# Patient Record
Sex: MALE | Race: ASIAN | NOT HISPANIC OR LATINO | Employment: FULL TIME | URBAN - METROPOLITAN AREA
[De-identification: names, ages, dates, MRNs, and addresses within clinical notes are randomized per-mention and may not be internally consistent; named-entity substitution may affect disease eponyms.]

---

## 2017-01-09 ENCOUNTER — OFFICE VISIT (OUTPATIENT)
Dept: OPHTHALMOLOGY | Facility: CLINIC | Age: 62
End: 2017-01-09
Payer: COMMERCIAL

## 2017-01-09 DIAGNOSIS — Z98.890 POST-OPERATIVE STATE: Primary | ICD-10-CM

## 2017-01-09 DIAGNOSIS — T85.398A EXPOSURE OF ORBITAL IMPLANT, INITIAL ENCOUNTER: ICD-10-CM

## 2017-01-09 PROCEDURE — 99024 POSTOP FOLLOW-UP VISIT: CPT | Mod: S$GLB,,, | Performed by: OPHTHALMOLOGY

## 2017-01-09 PROCEDURE — 99999 PR PBB SHADOW E&M-EST. PATIENT-LVL II: CPT | Mod: PBBFAC,,, | Performed by: OPHTHALMOLOGY

## 2017-01-09 RX ORDER — TOBRAMYCIN AND DEXAMETHASONE 3; 1 MG/ML; MG/ML
1 SUSPENSION/ DROPS OPHTHALMIC
Qty: 5 ML | Refills: 0 | Status: SHIPPED | OUTPATIENT
Start: 2017-01-09 | End: 2017-01-19

## 2017-01-09 NOTE — PROGRESS NOTES
Assessment /Plan     For exam results, see Encounter Report.    Post-operative state    Exposure of orbital implant, initial encounter    1) Pow #1 s/p removal of medpor conical orbital implant with placement of dermis fat graft.    - Pt doing well, denies pain. Removed temporary tarsorrhaphy. Removed prolene sutures over site of fat graft.  Steri-strips placed today.    - Start tobradex luana and tobradex gtts.  Plan discussed with the patient's daughter over the telephone.      Patient doing well! Post-operative instructions reviewed. Sutures removed. All questions answered.  Return in 1 week prn sooner any worsening of vision/symptoms or any concerns.    I have reviewed and concur with the resident's history, physical, assessment, and plan.  I have personally interviewed and examined the patient.

## 2017-01-16 ENCOUNTER — OFFICE VISIT (OUTPATIENT)
Dept: OPHTHALMOLOGY | Facility: CLINIC | Age: 62
End: 2017-01-16
Payer: COMMERCIAL

## 2017-01-16 DIAGNOSIS — Z98.890 POST-OPERATIVE STATE: Primary | ICD-10-CM

## 2017-01-16 PROCEDURE — 99024 POSTOP FOLLOW-UP VISIT: CPT | Mod: S$GLB,,, | Performed by: OPHTHALMOLOGY

## 2017-01-16 PROCEDURE — 99999 PR PBB SHADOW E&M-EST. PATIENT-LVL I: CPT | Mod: PBBFAC,,, | Performed by: OPHTHALMOLOGY

## 2017-01-16 PROCEDURE — 99211 OFF/OP EST MAY X REQ PHY/QHP: CPT | Mod: PBBFAC | Performed by: OPHTHALMOLOGY

## 2017-01-16 PROCEDURE — 92285 EXTERNAL OCULAR PHOTOGRAPHY: CPT | Mod: 50,PBBFAC | Performed by: OPHTHALMOLOGY

## 2017-01-16 NOTE — PROGRESS NOTES
HPI     Post-op Evaluation    Additional comments: sx 12/30/16           Comments   TD drops qid       Last edited by Dea Griggs on 1/16/2017  8:19 AM. (History)            Assessment /Plan     For exam results, see Encounter Report.    Post-operative state  -     External Photography - OU - Both Eyes      Patient doing well! Post-operative instructions reviewed. All questions answered. Return in 4 weeks prn sooner any worsening of vision/symptoms or any concerns.

## 2017-02-16 ENCOUNTER — OFFICE VISIT (OUTPATIENT)
Dept: OPHTHALMOLOGY | Facility: CLINIC | Age: 62
End: 2017-02-16
Payer: COMMERCIAL

## 2017-02-16 DIAGNOSIS — Z98.890 POST-OPERATIVE STATE: Primary | ICD-10-CM

## 2017-02-16 PROCEDURE — 99024 POSTOP FOLLOW-UP VISIT: CPT | Mod: S$GLB,,, | Performed by: OPHTHALMOLOGY

## 2017-02-16 PROCEDURE — 99999 PR PBB SHADOW E&M-EST. PATIENT-LVL II: CPT | Mod: PBBFAC,,, | Performed by: OPHTHALMOLOGY

## 2017-02-16 NOTE — PROGRESS NOTES
HPI     Post-op Evaluation    Additional comments: 1 month f/u           Comments   S/p removal ocular implant 12/30/16  Pt. Is here with wife today. He is having no pain. No drops.  Needs drivers license form filled out.       Last edited by Dea Griggs on 2/16/2017  8:01 AM. (History)            Assessment /Plan     For exam results, see Encounter Report.    Post-operative state      S/p placement removal of ocular implant with placement of dermis fat graft os.  Patient healing well.  Conformer in place.      Follow-up with Hugo Rosas for prosthesis fitting.  Return in one month prn sooner any worsening.

## 2017-03-21 ENCOUNTER — OFFICE VISIT (OUTPATIENT)
Dept: OPHTHALMOLOGY | Facility: CLINIC | Age: 62
End: 2017-03-21
Payer: COMMERCIAL

## 2017-03-21 DIAGNOSIS — Z98.890 POST-OPERATIVE STATE: Primary | ICD-10-CM

## 2017-03-21 PROCEDURE — 99999 PR PBB SHADOW E&M-EST. PATIENT-LVL II: CPT | Mod: PBBFAC,,, | Performed by: OPHTHALMOLOGY

## 2017-03-21 PROCEDURE — 99024 POSTOP FOLLOW-UP VISIT: CPT | Mod: S$GLB,,, | Performed by: OPHTHALMOLOGY

## 2017-03-21 NOTE — PROGRESS NOTES
HPI     S/p removal ocular implant with placement of dermis fat graft 12/30/16  Pt. Is here with wife today. He is having no pain. No drops.         Last edited by Ninfa Reed MD on 3/21/2017  8:56 AM.         Assessment /Plan     For exam results, see Encounter Report.    Post-operative state      Patient doing great after removal of ocular implant and placement of dermis fat graft.  Fitted with prosthesis by Hugo Rosas.  Patient very happy.  Emphasized importance of prosthesis hygiene.  Return in 6 months prn sooner any worsening.

## 2019-08-20 ENCOUNTER — TELEPHONE (OUTPATIENT)
Dept: HEMATOLOGY/ONCOLOGY | Facility: CLINIC | Age: 64
End: 2019-08-20

## 2019-09-03 ENCOUNTER — LAB VISIT (OUTPATIENT)
Dept: LAB | Facility: HOSPITAL | Age: 64
End: 2019-09-03
Payer: MEDICAID

## 2019-09-03 ENCOUNTER — INITIAL CONSULT (OUTPATIENT)
Dept: HEMATOLOGY/ONCOLOGY | Facility: CLINIC | Age: 64
End: 2019-09-03
Payer: MEDICAID

## 2019-09-03 VITALS
RESPIRATION RATE: 17 BRPM | DIASTOLIC BLOOD PRESSURE: 72 MMHG | SYSTOLIC BLOOD PRESSURE: 113 MMHG | TEMPERATURE: 98 F | HEART RATE: 69 BPM | WEIGHT: 163.56 LBS | HEIGHT: 64 IN | OXYGEN SATURATION: 97 % | BODY MASS INDEX: 27.92 KG/M2

## 2019-09-03 DIAGNOSIS — D72.829 LEUKOCYTOSIS, UNSPECIFIED TYPE: Primary | ICD-10-CM

## 2019-09-03 DIAGNOSIS — D72.829 LEUKOCYTOSIS, UNSPECIFIED TYPE: ICD-10-CM

## 2019-09-03 LAB
BASOPHILS # BLD AUTO: 0.08 K/UL (ref 0–0.2)
BASOPHILS # BLD AUTO: 0.08 K/UL (ref 0–0.2)
BASOPHILS NFR BLD: 0.7 % (ref 0–1.9)
BASOPHILS NFR BLD: 0.7 % (ref 0–1.9)
DIFFERENTIAL METHOD: ABNORMAL
DIFFERENTIAL METHOD: ABNORMAL
EOSINOPHIL # BLD AUTO: 1.1 K/UL (ref 0–0.5)
EOSINOPHIL # BLD AUTO: 1.1 K/UL (ref 0–0.5)
EOSINOPHIL NFR BLD: 9.3 % (ref 0–8)
EOSINOPHIL NFR BLD: 9.3 % (ref 0–8)
ERYTHROCYTE [DISTWIDTH] IN BLOOD BY AUTOMATED COUNT: 13.1 % (ref 11.5–14.5)
ERYTHROCYTE [DISTWIDTH] IN BLOOD BY AUTOMATED COUNT: 13.1 % (ref 11.5–14.5)
HCT VFR BLD AUTO: 49.1 % (ref 40–54)
HCT VFR BLD AUTO: 49.1 % (ref 40–54)
HGB BLD-MCNC: 16.9 G/DL (ref 14–18)
HGB BLD-MCNC: 16.9 G/DL (ref 14–18)
IMM GRANULOCYTES # BLD AUTO: 0.04 K/UL (ref 0–0.04)
IMM GRANULOCYTES # BLD AUTO: 0.04 K/UL (ref 0–0.04)
IMM GRANULOCYTES NFR BLD AUTO: 0.3 % (ref 0–0.5)
IMM GRANULOCYTES NFR BLD AUTO: 0.3 % (ref 0–0.5)
LDH SERPL L TO P-CCNC: 223 U/L (ref 110–260)
LYMPHOCYTES # BLD AUTO: 2.6 K/UL (ref 1–4.8)
LYMPHOCYTES # BLD AUTO: 2.6 K/UL (ref 1–4.8)
LYMPHOCYTES NFR BLD: 22.1 % (ref 18–48)
LYMPHOCYTES NFR BLD: 22.1 % (ref 18–48)
MCH RBC QN AUTO: 30.5 PG (ref 27–31)
MCH RBC QN AUTO: 30.5 PG (ref 27–31)
MCHC RBC AUTO-ENTMCNC: 34.4 G/DL (ref 32–36)
MCHC RBC AUTO-ENTMCNC: 34.4 G/DL (ref 32–36)
MCV RBC AUTO: 89 FL (ref 82–98)
MCV RBC AUTO: 89 FL (ref 82–98)
MONOCYTES # BLD AUTO: 0.6 K/UL (ref 0.3–1)
MONOCYTES # BLD AUTO: 0.6 K/UL (ref 0.3–1)
MONOCYTES NFR BLD: 5.3 % (ref 4–15)
MONOCYTES NFR BLD: 5.3 % (ref 4–15)
NEUTROPHILS # BLD AUTO: 7.3 K/UL (ref 1.8–7.7)
NEUTROPHILS # BLD AUTO: 7.3 K/UL (ref 1.8–7.7)
NEUTROPHILS NFR BLD: 62.3 % (ref 38–73)
NEUTROPHILS NFR BLD: 62.3 % (ref 38–73)
NRBC BLD-RTO: 0 /100 WBC
NRBC BLD-RTO: 0 /100 WBC
PLATELET # BLD AUTO: 227 K/UL (ref 150–350)
PLATELET # BLD AUTO: 227 K/UL (ref 150–350)
PMV BLD AUTO: 10 FL (ref 9.2–12.9)
PMV BLD AUTO: 10 FL (ref 9.2–12.9)
RBC # BLD AUTO: 5.55 M/UL (ref 4.6–6.2)
RBC # BLD AUTO: 5.55 M/UL (ref 4.6–6.2)
WBC # BLD AUTO: 11.68 K/UL (ref 3.9–12.7)
WBC # BLD AUTO: 11.68 K/UL (ref 3.9–12.7)

## 2019-09-03 PROCEDURE — 87516 HEPATITIS B DNA AMP PROBE: CPT

## 2019-09-03 PROCEDURE — 99999 PR PBB SHADOW E&M-EST. PATIENT-LVL V: CPT | Mod: PBBFAC,,,

## 2019-09-03 PROCEDURE — 99999 PR PBB SHADOW E&M-EST. PATIENT-LVL V: ICD-10-PCS | Mod: PBBFAC,,,

## 2019-09-03 PROCEDURE — 85060 BLOOD SMEAR INTERPRETATION: CPT | Mod: ,,, | Performed by: PATHOLOGY

## 2019-09-03 PROCEDURE — 36415 COLL VENOUS BLD VENIPUNCTURE: CPT

## 2019-09-03 PROCEDURE — 86704 HEP B CORE ANTIBODY TOTAL: CPT

## 2019-09-03 PROCEDURE — 88271 CYTOGENETICS DNA PROBE: CPT | Mod: 59

## 2019-09-03 PROCEDURE — 85025 COMPLETE CBC W/AUTO DIFF WBC: CPT

## 2019-09-03 PROCEDURE — 86803 HEPATITIS C AB TEST: CPT

## 2019-09-03 PROCEDURE — 99204 PR OFFICE/OUTPT VISIT, NEW, LEVL IV, 45-59 MIN: ICD-10-PCS | Mod: S$PBB,,,

## 2019-09-03 PROCEDURE — 81170 ABL1 GENE: CPT

## 2019-09-03 PROCEDURE — 86707 HEPATITIS BE ANTIBODY: CPT

## 2019-09-03 PROCEDURE — 86706 HEP B SURFACE ANTIBODY: CPT

## 2019-09-03 PROCEDURE — 99215 OFFICE O/P EST HI 40 MIN: CPT | Mod: PBBFAC

## 2019-09-03 PROCEDURE — 85060 PATHOLOGIST REVIEW: ICD-10-PCS | Mod: ,,, | Performed by: PATHOLOGY

## 2019-09-03 PROCEDURE — 99204 OFFICE O/P NEW MOD 45 MIN: CPT | Mod: S$PBB,,,

## 2019-09-03 PROCEDURE — 83615 LACTATE (LD) (LDH) ENZYME: CPT

## 2019-09-03 RX ORDER — ERGOCALCIFEROL 1.25 MG/1
CAPSULE ORAL
Refills: 3 | COMMUNITY
Start: 2019-06-17

## 2019-09-03 RX ORDER — KETOCONAZOLE 20 MG/G
CREAM TOPICAL
Refills: 1 | COMMUNITY
Start: 2019-08-09

## 2019-09-03 RX ORDER — HYDROCODONE BITARTRATE AND ACETAMINOPHEN 5; 325 MG/1; MG/1
1 TABLET ORAL EVERY 6 HOURS PRN
COMMUNITY

## 2019-09-03 RX ORDER — NAPROXEN 500 MG/1
500 TABLET ORAL 2 TIMES DAILY
COMMUNITY

## 2019-09-03 RX ORDER — ATORVASTATIN CALCIUM 80 MG/1
TABLET, FILM COATED ORAL
Refills: 1 | COMMUNITY
Start: 2019-08-22

## 2019-09-03 RX ORDER — METFORMIN HYDROCHLORIDE 500 MG/1
TABLET, EXTENDED RELEASE ORAL
Refills: 1 | COMMUNITY
Start: 2019-08-13

## 2019-09-03 RX ORDER — LORATADINE 10 MG/1
10 TABLET ORAL DAILY
COMMUNITY

## 2019-09-03 RX ORDER — GLYBURIDE 5 MG/1
TABLET ORAL
Refills: 1 | COMMUNITY
Start: 2019-08-13

## 2019-09-03 RX ORDER — FLUTICASONE PROPIONATE 50 MCG
1 SPRAY, SUSPENSION (ML) NASAL DAILY
COMMUNITY

## 2019-09-03 RX ORDER — INDOMETHACIN 50 MG/1
CAPSULE ORAL
Refills: 1 | COMMUNITY
Start: 2019-08-09 | End: 2019-09-03

## 2019-09-03 RX ORDER — CIMETIDINE 400 MG/1
TABLET, FILM COATED ORAL
Refills: 2 | COMMUNITY
Start: 2019-07-12

## 2019-09-03 RX ORDER — TIOTROPIUM BROMIDE AND OLODATEROL 3.124; 2.736 UG/1; UG/1
SPRAY, METERED RESPIRATORY (INHALATION)
Refills: 2 | COMMUNITY
Start: 2019-08-13 | End: 2019-09-03

## 2019-09-03 RX ORDER — COLCHICINE 0.6 MG/1
CAPSULE ORAL
Refills: 2 | COMMUNITY
Start: 2019-07-27

## 2019-09-03 RX ORDER — MONTELUKAST SODIUM 10 MG/1
TABLET ORAL
Refills: 2 | COMMUNITY
Start: 2019-08-22

## 2019-09-03 RX ORDER — TRIAMCINOLONE ACETONIDE 1 MG/G
CREAM TOPICAL
Refills: 1 | COMMUNITY
Start: 2019-08-09

## 2019-09-03 RX ORDER — AMOXICILLIN 875 MG/1
TABLET, FILM COATED ORAL
Refills: 0 | COMMUNITY
Start: 2019-06-27 | End: 2019-09-03

## 2019-09-03 RX ORDER — LOSARTAN POTASSIUM AND HYDROCHLOROTHIAZIDE 25; 100 MG/1; MG/1
TABLET ORAL
Refills: 1 | COMMUNITY
Start: 2019-08-13

## 2019-09-04 LAB
HBV CORE AB SERPL QL IA: POSITIVE
HBV SURFACE AB SER-ACNC: NEGATIVE M[IU]/ML
HCV AB SERPL QL IA: NEGATIVE
PATH REV BLD -IMP: NORMAL
PATH REV BLD -IMP: NORMAL

## 2019-09-04 NOTE — PROGRESS NOTES
[facility]  [facility]  Hematology/Medical Oncology Clinic      PATIENT: Fernando Pickett  MRN: [id number]  DATE: 9/3/2019    Diagnosis:   1. Leukocytosis, unspecified type      Chief Complaint: Advice Only; R shoulder numbness/tingling; and Fatigue (x )    Other MDs:  PCP: Son Kasia     Subjective:   Initial History: Mr. Pickett is a 63 y.o. male who presents to the clinic for noted leukocytosis on outside labs.    He is Macedonian speaking and accompanied by his wife, who also speaks Macedonian. Interview was conducted with the help of an electronic  service.    He is a poor historian. There is limited info in the media tab and he does not have any records with him today.    He denies personal or close family hx of hematological disease though he believes his mother had head and neck cancer back in [place].    Mr. Pickett was sent here today for leukocytosis noted on a routine cbc at [place] done by his PCP, reportedly as a routine check. He has no concerning review of symptoms including unintentional weight loss, night sweats/fevers, change in diet or appetite, easy bruising. He does admit to mild day time fatigue.    He is originally from [place] where he emigrated in 1981 to [place] and then moved to [facility] [place] [facility] in the early 90s.    He reports long standing multi-pack year history of tobacco abuse. Admits to occasional EtOH use though does not appear to be substantial.     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    He reports that he was told on that his last WBC count was abnormal and when looking back at his last couple CBCs, reportedly he had an abnormal WBC for the past several months, though we are not privy to that information today.    Labs that we have today look like they are from 7/31/19 and show:  WBC 13.2  RBC 5.35, Hgb 16/Hct 47  Plt 178  CMP with mild renal disease.    Past Medical History:   Diagnosis Date    Asthma      Diabetes mellitus     Gout     Hypercholesteremia     Hypertension      Past Surgical History:   Procedure Laterality Date    CONJUNCTIVOPLASTY Left 12/30/2016    Performed by [facility] at [facility] OR 1ST FLR    EVISCERATION-EYEBALL Left 6/13/2014    Performed by Hugo Huffman MD at [facility] OR 1ST FLR    eviseration left eye Left 2014    REMOVAL IMPLANT/OCULAR IMPLANT Left 12/30/2016    Performed by [facility]a [facility] at [facility] OR [facility]ST FLR    REPAIR-OPEN GLOBE Left 5/15/2014    Performed by Zulma Sierra MD at [facility] OR 1ST FLR     Family History   Problem Relation Age of Onset    Cancer Mother         head and neck cancer    No Known Problems Father     Amblyopia Neg Hx     Blindness Neg Hx     Cataracts Neg Hx     Diabetes Neg Hx     Glaucoma Neg Hx     Hypertension Neg Hx     Macular degeneration Neg Hx     Retinal detachment Neg Hx     Strabismus Neg Hx     Stroke Neg Hx     Thyroid disease Neg Hx       reports that he has been smoking cigarettes.  He has a 11.25 pack-year smoking history. He has never used smokeless tobacco. He reports that he drinks about 0.6 oz of alcohol per week. He reports that he does not use drugs.  Review of patient's allergies indicates:  No Known Allergies  Current Outpatient Medications   Medication Sig Dispense Refill    ALBUTEROL SULFATE INHL Inhale into the lungs.      amlodipine (NORVASC) 10 MG tablet Take 10 mg by mouth every morning.      atorvastatin (LIPITOR) 80 MG tablet take 1 tablet by mouth once a day ([facility]el ngay, 1 tigre) ([facility] z[facility]ta mo)  1    cimetidine (TAGAMET) 400 MG tablet take 1 tablet by mouth once a day ([facility] ngay, 1 tigre) ([facility] ki tu)  2    colchicine (MITIGARE) 0.6 mg Cap TAKE 1 TABLET BY MOUTH TWICE A DAY AS DIRECTED AS NEEDED FOR gout pain ([facility] mot ngay 2 rossy, kel rossy 1 tigre khi bi licha [facility] gout/melvin thap)  2    fluticasone propionate (FLONASE) 50 mcg/actuation nasal spray 1 spray by Each Nostril route once daily.       glyBURIDE (DIABETA) 5 MG tablet TAKE 1 tablet two times a day ([facility] ngay 2 rossy, kel rossy 1 tigre) ([facility])  1    glyBURIDE-metformin 5-500 mg (GLUCOVANCE) 5-500 mg Tab Take 1 tablet by mouth daily with breakfast.      HYDROcodone-acetaminophen (NORCO) 5-325 mg per tablet Take 1 tablet by mouth every 6 (six) hours as needed for Pain.      ketoconazole (NIZORAL) 2 % cream APPLY TO THE AFFECTED AREA TWICE A DAY AS DIRECTED (i mot ngay 2 rossy)([facility] nam da)  1    loratadine (CLARITIN) 10 mg tablet Take 10 mg by mouth once daily.      losartan-hydrochlorothiazide 100-25 mg (HYZAAR) 100-25 mg per tablet take 1 tablet by mouth once a day ([facility], 1 tigre) ([facility])  1    metFORMIN (GLUCOPHAGE-XR) 500 MG 24 hr tablet TAKE 1 TABLET BY MOUTH TWICE A DAY with food ([facility] 2 [facility] 1 tigre voi thuc an)([facility])  1    montelukast (SINGULAIR) 10 mg tablet TAKE 1 TABLET BY MOUTH ONCE A DAY AS NEEDED FOR allergies ([facility], 1 tigre khi bi di luana, so bennie, hat si, va suyen)  2    naproxen (NAPROSYN) 500 MG tablet Take 500 mg by mouth 2 (two) times daily.      ONETOUCH DELICA LANCETS 33 gauge Misc check glucose once to twice a DAY AS DIRECTED ([facility])  5    ONETOUCH ULTRA BLUE TEST STRIP Strp test glucose once or twice a DAY AS DIRECTED ([facility])  5    tamsulosin (FLOMAX) 0.4 mg Cp24 Take 0.4 mg by mouth after dinner.      triamcinolone acetonide 0.1% (KENALOG) 0.1 % cream APPLY TO THE AFFECTED AREA TWICE A DAY AS DIRECTED (boi ngay 2 rossy) ([facility] [place] da)  1    VITAMIN D2 50,000 unit capsule TAKE ONE capsule ONCE A WEEK ([facility] 1 ernesto mot rossy, [facility] rossy 1 tigre)(thuoc jori vitamin d)  3     No current facility-administered medications for this visit.      Review of Systems   Constitutional: Negative for appetite change, chills, fatigue and unexpected weight change.   HENT: Negative for dental problem, mouth  "sores, nosebleeds, trouble swallowing and voice change.    Eyes: Negative for visual disturbance.   Respiratory: Negative for chest tightness and shortness of breath.    Cardiovascular: Negative for chest pain, palpitations and leg swelling.   Gastrointestinal: Negative for abdominal distention, abdominal pain, blood in stool, diarrhea, nausea and vomiting.   Endocrine: Negative for cold intolerance.   Genitourinary: Negative for hematuria.   Musculoskeletal: Negative for neck pain.   Skin: Negative for pallor and rash.   Allergic/Immunologic: Negative for immunocompromised state.   Neurological: Negative for dizziness, weakness and headaches.   Hematological: Negative for adenopathy. Does not bruise/bleed easily.   Psychiatric/Behavioral: Negative for agitation and behavioral problems.     ECOG Performance Status: 0    Objective:      Vitals:   Vitals:    09/03/19 1436   BP: 113/72   Pulse: 69   Resp: 17   Temp: 98.1 °F (36.7 °C)   TempSrc: Oral   SpO2: 97%   Weight: 74.2 kg (163 lb 9.3 oz)   Height: 5' 4" (1.626 m)     BMI: Body mass index is 28.08 kg/m².    Physical Exam   Constitutional: He is oriented to person, place, and time. He appears well-developed and well-nourished.   HENT:   Head: Normocephalic and atraumatic.   Eyes: Pupils are equal, round, and reactive to light. EOM are normal.   Neck: Normal range of motion. Neck supple.   Cardiovascular: Normal rate and regular rhythm.   Pulmonary/Chest: Effort normal and breath sounds normal. He has no wheezes.   Abdominal: Soft. Bowel sounds are normal. He exhibits no mass.   Musculoskeletal: Normal range of motion. He exhibits no edema.   Lymphadenopathy:        Head (right side): No submandibular, no posterior auricular and no occipital adenopathy present.        Head (left side): No submandibular, no posterior auricular and no occipital adenopathy present.     He has no cervical adenopathy.     He has no axillary adenopathy.        Right: No inguinal and no " supraclavicular adenopathy present.        Left: No inguinal and no supraclavicular adenopathy present.   Neurological: He is alert and oriented to person, place, and time.   Skin: Skin is warm and dry.   Psychiatric: He has a normal mood and affect. His behavior is normal.   Vitals reviewed.    Laboratory Data:  Lab Visit on 09/03/2019   Component Date Value Ref Range Status    WBC 09/03/2019 11.68  3.90 - 12.70 K/uL Final    RBC 09/03/2019 5.55  4.60 - 6.20 M/uL Final    Hemoglobin 09/03/2019 16.9  14.0 - 18.0 g/dL Final    Hematocrit 09/03/2019 49.1  40.0 - 54.0 % Final    Mean Corpuscular Volume 09/03/2019 89  82 - 98 fL Final    Mean Corpuscular Hemoglobin 09/03/2019 30.5  27.0 - 31.0 pg Final    Mean Corpuscular Hemoglobin Conc 09/03/2019 34.4  32.0 - 36.0 g/dL Final    RDW 09/03/2019 13.1  11.5 - 14.5 % Final    Platelets 09/03/2019 227  150 - 350 K/uL Final    MPV 09/03/2019 10.0  9.2 - 12.9 fL Final    Immature Granulocytes 09/03/2019 0.3  0.0 - 0.5 % Final    Gran # (ANC) 09/03/2019 7.3  1.8 - 7.7 K/uL Final    Immature Grans (Abs) 09/03/2019 0.04  0.00 - 0.04 K/uL Final    Comment: Mild elevation in immature granulocytes is non specific and   can be seen in a variety of conditions including stress response,   acute inflammation, trauma and pregnancy. Correlation with other   laboratory and clinical findings is essential.      Lymph # 09/03/2019 2.6  1.0 - 4.8 K/uL Final    Mono # 09/03/2019 0.6  0.3 - 1.0 K/uL Final    Eos # 09/03/2019 1.1* 0.0 - 0.5 K/uL Final    Baso # 09/03/2019 0.08  0.00 - 0.20 K/uL Final    nRBC 09/03/2019 0  0 /100 WBC Final    Gran% 09/03/2019 62.3  38.0 - 73.0 % Final    Lymph% 09/03/2019 22.1  18.0 - 48.0 % Final    Mono% 09/03/2019 5.3  4.0 - 15.0 % Final    Eosinophil% 09/03/2019 9.3* 0.0 - 8.0 % Final    Basophil% 09/03/2019 0.7  0.0 - 1.9 % Final    Differential Method 09/03/2019 Automated   Final    Reason for Referral, BCR/ABL, FISH* 09/03/2019  blood   Final    MBCR Source (BM) 09/03/2019 blood   Final    WBC 09/03/2019 11.68  3.90 - 12.70 K/uL Final    RBC 09/03/2019 5.55  4.60 - 6.20 M/uL Final    Hemoglobin 09/03/2019 16.9  14.0 - 18.0 g/dL Final    Hematocrit 09/03/2019 49.1  40.0 - 54.0 % Final    Mean Corpuscular Volume 09/03/2019 89  82 - 98 fL Final    Mean Corpuscular Hemoglobin 09/03/2019 30.5  27.0 - 31.0 pg Final    Mean Corpuscular Hemoglobin Conc 09/03/2019 34.4  32.0 - 36.0 g/dL Final    RDW 09/03/2019 13.1  11.5 - 14.5 % Final    Platelets 09/03/2019 227  150 - 350 K/uL Final    MPV 09/03/2019 10.0  9.2 - 12.9 fL Final    Immature Granulocytes 09/03/2019 0.3  0.0 - 0.5 % Final    Gran # (ANC) 09/03/2019 7.3  1.8 - 7.7 K/uL Final    Immature Grans (Abs) 09/03/2019 0.04  0.00 - 0.04 K/uL Final    Comment: Mild elevation in immature granulocytes is non specific and   can be seen in a variety of conditions including stress response,   acute inflammation, trauma and pregnancy. Correlation with other   laboratory and clinical findings is essential.      Lymph # 09/03/2019 2.6  1.0 - 4.8 K/uL Final    Mono # 09/03/2019 0.6  0.3 - 1.0 K/uL Final    Eos # 09/03/2019 1.1* 0.0 - 0.5 K/uL Final    Baso # 09/03/2019 0.08  0.00 - 0.20 K/uL Final    nRBC 09/03/2019 0  0 /100 WBC Final    Gran% 09/03/2019 62.3  38.0 - 73.0 % Final    Lymph% 09/03/2019 22.1  18.0 - 48.0 % Final    Mono% 09/03/2019 5.3  4.0 - 15.0 % Final    Eosinophil% 09/03/2019 9.3* 0.0 - 8.0 % Final    Basophil% 09/03/2019 0.7  0.0 - 1.9 % Final    Differential Method 09/03/2019 Automated   Final    LD 09/03/2019 223  110 - 260 U/L Final    Results are increased in hemolyzed samples.    Pathologist Review 09/03/2019 Review required   Final         Assessment:       1. Leukocytosis, unspecified type        Oncologic History:      NA    Plan:     Mr. Pickett reports to the hematology clinic for further evaluation for leukocytosis that has been reported evident on  multiple consecutive draws. He has no obvious concerning ROS and I do not have enough blood work history present to make an informed diagnosis. I have a very low suspicion for a present hematological process though will perform a further evaluation to rule such out. He does not appear to have a significant work or person exposure history, he has been vaccinated and have lived in this [place] for many [place] at this point.    -Hepatitis B/C labs  -repeat CBC  -smear to be read by pathology  -FISH BCR/ABL, blood  -LDH  -follow up in the clinic in pending results of labs    Discussed with Dr. Valenzuela.    Franky Sharpe MD  Hematology/Medical Oncology Fellow  [phone] (cell)  [phone] (page)

## 2019-09-05 LAB — HBV DNA SERPL QL NAA+PROBE: NOT DETECTED

## 2019-09-06 LAB — HBV E AB SER QL: ABNORMAL

## 2019-09-16 ENCOUNTER — TELEPHONE (OUTPATIENT)
Dept: HEMATOLOGY/ONCOLOGY | Facility: CLINIC | Age: 64
End: 2019-09-16

## 2019-09-16 LAB — MAYO MISCELLANEOUS RESULT (REF): NORMAL

## 2019-09-16 NOTE — TELEPHONE ENCOUNTER
Called Dr. Pedroza, PCP to discuss results of consult for leukocytosis.    No obvious of concerning hematological process though did uncover Hep B core and E Ab, likely alluding to active hepatitis B infection.    Called to discuss results and no need at this time to follow up with hematology unless leukocytosis worsens or remains present.    Franky Sharpe MD

## 2020-01-29 LAB
DIAGNOSTIC BCR/ABL 1 RESULT: NORMAL
NARRATIVE DIAGNOSTIC REPORT-IMP: NORMAL
SPECIMEN SOURCE: NORMAL